# Patient Record
Sex: FEMALE | Race: WHITE
[De-identification: names, ages, dates, MRNs, and addresses within clinical notes are randomized per-mention and may not be internally consistent; named-entity substitution may affect disease eponyms.]

---

## 2018-09-01 ENCOUNTER — HOSPITAL ENCOUNTER (INPATIENT)
Dept: HOSPITAL 43 - DL.OBCHECK | Age: 23
LOS: 2 days | Discharge: HOME | DRG: 560 | End: 2018-09-03
Attending: OBSTETRICS & GYNECOLOGY | Admitting: OBSTETRICS & GYNECOLOGY
Payer: COMMERCIAL

## 2018-09-01 DIAGNOSIS — Z3A.39: ICD-10-CM

## 2018-09-01 PROCEDURE — 00HU33Z INSERTION OF INFUSION DEVICE INTO SPINAL CANAL, PERCUTANEOUS APPROACH: ICD-10-PCS

## 2018-09-01 PROCEDURE — 0HQ9XZZ REPAIR PERINEUM SKIN, EXTERNAL APPROACH: ICD-10-PCS | Performed by: OBSTETRICS & GYNECOLOGY

## 2018-09-01 NOTE — PCM.SN
- Free Text/Narrative


Note: 





Intrathecal. Sitting position, sterile prep and drape. 1 % lidocaine w bicarb 

for skinwheal to L3 L4 interspace, introducer, 24 ga pencan x 1. Pos CSF, neg 

heme, neg parasthesia. 1:1000 epi wash pf, 20 mcg pf sufenta, 30 mcg pf fentanyl

, 0.4 ml pf ns and 6 mg of 0.75% pf bupivacaine injected after CSf aspiration. 

Pt to L lateral position. Procedure time 0693to 0647

## 2018-09-02 RX ADMIN — VITAMIN A, ASCORBIC ACID, CHOLECALCIFEROL, .ALPHA.-TOCOPHEROL ACETATE, DL-, THIAMINE MONONITRATE, RIBOFLAVIN, NIACINAMIDE, PYRIDOXINE HYDROCHLORIDE, FOLIC ACID, CYANOCOBALAMIN, CALCIUM CARBONATE, IRON, ZINC OXIDE, AND CUPRIC OXIDE SCH EACH: 4000; 120; 400; 22; 1.84; 3; 20; 10; 1; 12; 200; 29; 25; 2 TABLET ORAL at 09:07

## 2018-09-02 NOTE — PN
DATE:  09/02/2018

 

SUBJECTIVE:  Dalia is doing well on postpartum rounds today.  She did deliver

vaginally yesterday.  Please see our delivery note.

 

OBJECTIVE:  Her vital signs are stable today including afebrile.  Her lochia

flow is within normal limits, and her fundus is firm.  She is breastfeeding.

Her extremities are negative.

 

ASSESSMENT:  Stable postpartum course because she is a primigravida and also was

group B streptococcus positive, but did receive 2 doses of antibiotics.  We will

permit discharge tomorrow on Monday morning.

 

PLAN:  See above.  Progressive ambulation, adequate hydration, and healthy

nutrition were reviewed with her.  She will go home tomorrow.

 

DD:  09/02/2018 10:24:22

DT:  09/02/2018 10:31:18

[facility]

Job #:  [id number]

## 2018-09-02 NOTE — DEL
DATE:  2018

 

Dalia has proceeded on during the 2nd stage of labor.  She does have good

pushing effort.  She does have the vertex  for a number of minutes and

does have a very tight introitus, and because of the high likelihood of

sustaining a severe perineal laceration, when she was not able to push the baby

out spontaneously and towards the end of her labor we did a small to average

sized midline episiotomy.  She did proceed on very nicely to now spontaneously

deliver a viable baby boy, who weighed 8 pounds 5 ounces and had Apgar scores of

9 and 9.  The placenta was spontaneous and intact.  Close inspection of the

vaginal vault reveals no major severe lacerations; however, she does have a

small right vaginal sulcus laceration, which was repaired initially with 3-0

Vicryl.  Now, the remainder of the midline episiotomy is repaired in the usual

fashion.  Estimated blood loss from the procedure was approximately 300 mL.  The

patient and baby have remained very stable in the postpartum area.  Please refer

to the electronic health record for further obstetrical details.  Sponge and

instrument count was also reported and verified as correct.

 

DD:  2018 13:23:27

DT:  2018 06:26:38

[facility]

Job #:  [id number]

## 2018-09-03 RX ADMIN — VITAMIN A, ASCORBIC ACID, CHOLECALCIFEROL, .ALPHA.-TOCOPHEROL ACETATE, DL-, THIAMINE MONONITRATE, RIBOFLAVIN, NIACINAMIDE, PYRIDOXINE HYDROCHLORIDE, FOLIC ACID, CYANOCOBALAMIN, CALCIUM CARBONATE, IRON, ZINC OXIDE, AND CUPRIC OXIDE SCH EACH: 4000; 120; 400; 22; 1.84; 3; 20; 10; 1; 12; 200; 29; 25; 2 TABLET ORAL at 07:50

## 2018-09-03 RX ADMIN — VITAMIN A, ASCORBIC ACID, CHOLECALCIFEROL, .ALPHA.-TOCOPHEROL ACETATE, DL-, THIAMINE MONONITRATE, RIBOFLAVIN, NIACINAMIDE, PYRIDOXINE HYDROCHLORIDE, FOLIC ACID, CYANOCOBALAMIN, CALCIUM CARBONATE, IRON, ZINC OXIDE, AND CUPRIC OXIDE SCH: 4000; 120; 400; 22; 1.84; 3; 20; 10; 1; 12; 200; 29; 25; 2 TABLET ORAL at 08:53

## 2018-09-03 NOTE — DISCH
HISTORY OF PRESENT ILLNESS:  This patient is a 22-year-old,  1, now para

1 patient, who is followed by Dr. Maurice.  The patient was scheduled for

possible induction this coming Wednesday if she had not delivered.  However, the

patient did enter the hospital in active labor on 2018.  She was at 39

weeks 1-day gestation with an DANNY of 2018.  She also was known to be GBS

positive.  Please see my dictated H and P, and please see the dictated delivery

note.  The patient is immune to rubella.  Otherwise, her prenatal course has

been quite uncomplicated.  The patient was given IV penicillin G and did receive

approximately 2 doses intrapartum before she delivered.

 

She did proceed on to have a spontaneous vaginal delivery and did have a viable

male who weighed 8 pounds 5 ounces and had Apgar scores of 9 and 9.  The

delivery occurred on 2018.  Because of a very tight vaginal introitus and

in an effort to avoid severe perineal laceration in this case, we did elect to

do a small-to-average midline episiotomy, and this was discussed with she and

her  beforehand, and we did have their permission.  She did have the

repair of her midline episiotomy and small right vaginal sulcus laceration.  She

has continued to do well in the postpartum period.  We do note that she does

complain of some fissures and soreness on her breast areas, so therefore, we are

obtaining the specialized nipple cream that will be given to her consisting of

betamethasone, Eucerin, and miconazole.  The patient will also use a breast

pump.

 

PHYSICAL EXAMINATION:

The patient's vital signs have remained stable.  Her lochia flow is within

normal limits, and her fundus is firm.  Her extremities are negative.

 

LABORATORY DATA:  Discharge hemoglobin is 10.5.

 

DISCHARGE INSTRUCTIONS:  Other discharge instructions consisted of instructing

her to call us if any excess fever, excess pain, or excess bleeding.  She will

contact us at once if any questions or problems or unusual symptoms.  She also

will do progressive ambulation at home.  She will avoid intercourse for

approximately 6 weeks.  She also will do sitz baths at home p.r.n.

 

DISCHARGE MEDICATIONS:

1. Ibuprofen or Tylenol p.r.n.

2. Benzocaine spray p.r.n.

She also will have breast pump and special compounded nipple ointment from our

clinic pharmacy.

 

DIET:  Regular.

 

CONDITION:  Good on discharge.

 

OPERATIONS AND PROCEDURES:

1. Spontaneous vaginal delivery of a viable male who weighs 8 pounds 5 ounces

    and has Apgar scores of 9 and 9.

2. Repair of midline episiotomy and small right vaginal sulcus laceration.

 

FINAL DIAGNOSES:

1.  1, now para 1 at 39 weeks 1-day gestation, delivered.

2. Group B streptococcus positive.

 

DD:  2018 10:58:39

DT:  2018 13:09:45

[facility]

Job #:  [id number]

## 2018-09-04 NOTE — HP
LOCATION:  [facility].

 

HISTORY OF PRESENT ILLNESS:  This patient is a 22-year-old  1 patient,

who is followed by Dr. Maurice.  She does enter the hospital early this morning

on 2018 in labor and does give a history of having had clear amniotic

fluid coming out by spontaneous rupture of membranes at about 3:00 a.m. today on

2018.  The patient is also known to be GBS positive.  She also is immune

to rubella.  She denies any other significant prenatal complications that she is

aware of.

 

PAST MEDICAL HISTORY:  Denies any knowledge of heart, lung, liver, or kidney

disease.

 

ALLERGIES:  None known.

 

 

 

PREVIOUS SURGICAL HISTORY:  None.

 

MEDICATIONS AT PRESENT:

1. Prenatal vitamins.

2. Occasionally, she did take iron during the pregnancy.

 

FAMILY HISTORY:  Noncontributory.

 

SOCIAL HISTORY:  The patient is here with her  today, and the patient is

a nonsmoker and does not use alcohol or any street drugs.  Her mother is Carol Gutierrez from the [facility], and I have personally delivered 2 of Dalia's

brothers previously.

 

PHYSICAL EXAMINATION:

Vital Signs:  Blood pressure 114/68, pulse 72, and respirations 17.

HEENT:  The sclerae are nonicteric.

Lungs:  Clear to A.

Heart:  Regular rhythm without murmur.

Abdomen and Pelvis:  Gravid with fetal heart tones category 1, and the baseline

is 144.  Vertex is the presenting part.  There is negative CVA tenderness

bilaterally.  The patient was thought by the nurses to be close to 4 to 5 cm

dilated when she came in, and when I have seen the patient a bit later, she was

completely dilated and starting to push.  The vertex was at +1 station and 100%

effaced for the cervix when I saw her.  Clinical pelvimetry appears to be

adequate.  Vaginal introitus perhaps slightly tight.

Extremities:  Negative.

Neurologic:  Grossly intact.

 

IMPRESSION:  The patient has an estimated date of delivery of  and is

currently at 39 weeks 1 day gestation with spontaneous rupture of membranes.

The patient is group B Streptococcus positive, and she is being given penicillin

G prophylaxis.  Heart tones are category 1.  We do anticipate spontaneous

vaginal delivery.  We have thoroughly discussed with the patient and her 

what we do anticipate, and all of their questions have been answered.

 

DD:  2018 13:37:53

DT:  2018 21:24:32

[facility]

Job #:  [id number]

## 2020-12-29 NOTE — PCM.LDHP
L&D History of Present Illness





- General


Date of Service: 20 ( H&P)


Admit Problem/Dx: 


                   Patient Status Order with Admit Dx/Problem





20 00:05


Patient Status [ADT] Routine 








                           Admission Diagnosis/Problem











Admission Diagnosis/Problem    Labor established














Source of Information: Patient, Family, Provider, RN, RN Notes Reviewed, 

Significant Other, Other (prenatal record from Alt/EPIC notes--see for 

details)


History Limitations: Reports: No Limitations





- History of Present Illness


Introduction:: 





Delightful 26yo WF  @ 39w4d scheduled for induction later this morning who 

presented in active labor after having membranes stripped in the clinic 

yesterday at her [facility] with me. 


no LOF or bleeding. 


CXNs getting stronger and closer together since then. 


no pre-E sx. 


see prenatal notes for details. 


GBS negative


hx COVID during pregnancy--ASA until 36 weeks


Rubella immune








Location, Pregnancy: Reports: Uterus


Severity: Moderate


Associated Symptoms: Reports: vaginal discharge





- Related Data


Allergies/Adverse Reactions: 


                                    Allergies











Allergy/AdvReac Type Severity Reaction Status Date / Time


 


No Known Allergies Allergy   Verified 18 04:17











Home Medications: 


                                    Home Meds





Ferrous Sulfate 325 mg PO DAILY 18 [History]


Prenatal Vit Calc,Iron,Folic [Prenatal Vitamins] 1 each PO DAILY 18 

[History]











Past Medical History





- Past Health History


Medical/Surgical History: Denies Medical/Surgical History


HEENT History: Reports: None


Cardiovascular History: Reports: None


Respiratory History: Reports: None


Gastrointestinal History: Reports: None


Genitourinary History: Reports: None


OB/GYN History: Reports: Pregnancy


: 2


Para: 1


LMP (Approximate): Pregnant


Other OB/BYN History: prior vaginal delivery--Dr. Quiroz, needed episiotomy.  

elevated one hour sugar screen, 3hour normal


Musculoskeletal History: Reports: None


Neurological History: Reports: None


Psychiatric History: Reports: Anxiety, Depression


Endocrine/Metabolic History: Reports: None, Other (See Below)


Other Endocrine/Metabolic History: elevated one hour sugar screen, 3 hour normal


Hematologic History: Reports: Anemia


Oncologic (Cancer) History: Reports: None


Dermatologic History: Reports: None





- Infectious Disease History


Infectious Disease History: Reports: Other (See Below)


Other Infectious Disease History: Hx of COVID late Oct





- Past Surgical History


Head Surgeries/Procedures: Reports: None


Respiratory Surgical History: Reports: None


Female  Surgical History: Reports: None


Endocrine Surgical History: Reports: None


Musculoskeletal Surgical History: Reports: None





Social & Family History





- Family History


Family Medical History: No Pertinent Family History





- Tobacco Use


Tobacco Use Status *Q: Never Tobacco User


Second Hand Smoke Exposure: No





- Caffeine Use


Caffeine Use: Reports: Coffee


Caffeine Use Comment: 1-2 cups per day





- Recreational Drug Use


Recreational Drug Use: No





- Living Situation & Occupation


Living situation: Reports: , with Family


Occupation: Employed


Social History Comment: Lives with  Peewee and their son Sandra.  

Expecting another boy.  he is .  she has [employer] business.  mom--Celia Gutierrez, brother Fransisco Gutierrez, FLAVIO Aguillon.





H&P Review of Systems





- Review of Systems:


Review Of Systems: Comprehensive ROS is negative, except as noted in HPI.





L&D Exam





- Exam


Exam: See Below





- Vital Signs


Vital Signs: 


                                Last Vital Signs











Temp  98.2 F   20 02:15


 


Pulse  63   20 03:15


 


Resp  16   20 03:15


 


BP  106/55 L  20 03:15


 


Pulse Ox  98   20 03:15











Weight: 214 lb





- OB Specific


Contraction Duration (sec): 70-80


Contraction Frequency (min): 2-3


Contraction Intensity: Moderate


Fetal Movement: Active


Fetal Heart Tones: Present


Fetal Heart Tones per Min: 120


Fetal Heart Rate (FHR) Variability: Moderate (6-25 bmp)


Birth Presentation: Right Occiput Anterior (KAROLINA)


Estimated Fetal Weight: 8+lbs





- Vasques Score


Vasques Score Cervix Position: Posterior


Vasques Score Consistency: Soft


Vasques Score Effacement: >80%


Vasques Score Dilation: > 5 cm


Vasques Score Infant's Station: -1 ,0


Vasques Score Total: 10





- Exam


General: Alert, Oriented


HEENT: Conjunctiva Clear, EOMI, Hearing Intact, Nares Patent, Pupils Equal, 

Pupils Reactive


Neck: Supple


Lungs: Clear to Auscultation, Normal Respiratory Effort


Cardiovascular: Regular Rate, Regular Rhythm


GI/Abdominal Exam: Normal Bowel Sounds


Rectal Exam: Deferred


Genitourinary: Normal external exam, Cervical dilitation, Enlarged uterus


Back Exam: Normal Inspection


Extremities: Normal Inspection


Skin: Warm, Dry, Intact


Neurological: Normal Speech, Normal Tone, Sensation Intact


Psychiatric: Alert, Normal Affect, Normal Mood





- Patient Data


Lab Results Last 24 hrs: 


                         Laboratory Results - last 24 hr











  20 Range/Units





  23:50 


 


WBC  12.6 H  (5.0-10.0)  10^3/uL


 


RBC  4.44  (4.2-5.4)  10^6/uL


 


Hgb  12.6  D  (12.0-16.0)  g/dL


 


Hct  36.5 L  (37.0-47.0)  %


 


MCV  82.2  ()  fL


 


MCH  28.4  (27.0-34.0)  pg


 


MCHC  34.5  (33.0-35.0)  g/dL


 


Plt Count  255  (150-450)  10^3/uL











Result Diagrams: 


                                 20 23:50








- Problem List


(1) Spontaneous onset of labor


SNOMED Code(s): 28201690


   ICD Code: ATZ7039 -    Status: Acute   Current Visit: Yes   





(2) Term pregnancy


SNOMED Code(s): 04829281


   ICD Code: Z34.90 - ENCNTR FOR SUPRVSN OF NORMAL PREGNANCY, UNSP, UNSP 

TRIMESTER   Status: Acute   Current Visit: Yes   





(3) Term pregnancy delivered


SNOMED Code(s): 07065884, 227067866


   ICD Code: O80 - ENCOUNTER FOR FULL-TERM UNCOMPLICATED DELIVERY   Status: 

Acute   Current Visit: Yes   





(4) Vacuum extraction, delivered, current hospitalization


SNOMED Code(s): 148102721


   ICD Code: O66.5 - ATTEMPTED APPLICATION OF VACUUM EXTRACTOR AND FORCEPS   

Status: Acute   Current Visit: Yes   





(5) Vaginal delivery


SNOMED Code(s): 442478804


   ICD Code: O80 - ENCOUNTER FOR FULL-TERM UNCOMPLICATED DELIVERY   Status: 

Acute   Current Visit: Yes   





(6) Mother currently breast-feeding


SNOMED Code(s): 843183756


   ICD Code: Z39.1 - ENCOUNTER FOR CARE AND EXAMINATION OF LACTATING MOTHER   

Status: Acute   Current Visit: Yes   





(7) Rubella immune


SNOMED Code(s): 691177950


   ICD Code: Z78.9 - OTHER SPECIFIED HEALTH STATUS   Status: Acute   Current 

Visit: Yes   





(8) Blood type A+


SNOMED Code(s): 390438414


   ICD Code: Z67.10 - TYPE A BLOOD, RH POSITIVE   Status: Acute   Current Visit:

 Yes   





(9) Group B Streptococcus not isolated


SNOMED Code(s): 433753571


   ICD Code: MSM2403 -    Status: Acute   Current Visit: Yes   





(10) History of 2019 novel coronavirus disease (COVID-19)


SNOMED Code(s): 430764240749998101


   ICD Code: Z86.19 - PERSONAL HISTORY OF OTHER INFECTIOUS AND PARASITIC 

DISEASES   Status: Acute   Current Visit: Yes   


Problem List Initiated/Reviewed/Updated: Yes


Orders Last 24hrs: 


                               Active Orders 24 hr











 Category Date Time Status


 


 Patient Status [ADT] Routine ADT  20 00:05 Active


 


 Communication Order [RC] ASDIRECTED Care  20 00:05 Active


 


 Communication Order [RC] PER UNIT ROUTINE Care  20 00:05 Active


 


 Communication Order [RC] PER UNIT ROUTINE Care  20 00:05 Active


 


 Communication Order [RC] PER UNIT ROUTINE Care  20 00:05 Active


 


 Fetal Heart Tones [RC] PER UNIT ROUTINE Care  20 00:05 Active


 


 Notify Provider Vital Signs OB [RC] ASDIRECTED Care  20 00:05 Active


 


 Notify Provider [RC] PRN Care  20 00:05 Active


 


 Pump Management, Intrathecal [RC] ASDIRECTED Care  20 00:05 Active


 


 Up ad [place] [RC] ASDIRECTED Care  20 00:05 Active


 


 Vital Signs [RC] PER UNIT ROUTINE Care  20 00:05 Active


 


 Vital Signs [RC] PFP Care  20 05:22 Active


 


 Consult to Lactation Consultant [CONS] Routine Cons  20 05:20 Active


 


 Regular Diet [DIET] Diet  20 Breakfast Active


 


 Acetaminophen [TylenoL] Med  20 00:05 Active





 650 mg PO Q4H PRN   


 


 Benzocaine/Menthol [Dermoplast Pain Relief Spray] Med  20 05:20 Active





 See Dose Instructions  TOP Q4H PRN   


 


 Carboprost Tromethamine [Hemabate DS] Med  20 00:05 Active





 250 mcg IM ASDIRECTED PRN   


 


 Docusate Sodium [Colace] Med  20 05:20 Active





 100 mg PO BID PRN   


 


 Ibuprofen [Motrin] Med  20 05:20 Active





 800 mg PO Q8H PRN   


 


 Lactated Ringers [Ringers, Lactated] 1,000 ml Med  20 00:05 Active





 IV ASDIRECTED   


 


 Lactated Ringers [Ringers, Lactated] 500 ml Med  20 00:05 Active





 IV SEECOMMENT   


 


 Lidocaine 1% [Xylocaine-MPF 1%] Med  20 00:05 Active





 30 ml INJECT ASDIRECTED PRN   


 


 Methylergonovine [Methergine] Med  20 00:05 Active





 0.2 mg IM ASDIRECTED PRN   


 


 Naloxone [Narcan] Med  20 00:05 Active





 0.1 mg IVPUSH SEECOMMENT PRN   


 


 Ondansetron [Zofran] Med  20 00:05 Active





 4 mg IVPUSH Q4H PRN   


 


 Oxytocin/Normal Saline [Pitocin in NS 30 UNIT/500 ML] Med  20 00:05 

Active





 30 unit in 500 ml IV TITRATE   


 


 Prenatal Vit with Ca/FA/Iron [Prenatal Plus Iron] Med  20 09:00 Active





 1 each PO DAILY   


 


 Simethicone Med  20 05:20 Active





 80 mg PO Q4H PRN   


 


 Sodium Chloride 0.9% [Saline Flush] Med  20 00:05 Active





 10 ml FLUSH ASDIRECTED PRN   


 


 Tranexamic Acid [Cyklokapron] 1,000 mg Med  20 00:05 Active





 Sodium Chloride 0.9% [Normal Saline] 100 ml   





 IV ONETIME   


 


 Zolpidem [Ambien] Med  20 05:20 Active





 5 mg PO BEDTIME PRN   


 


 ePHEDrine [ePHEDrine sulfate] Med  20 00:05 Active





 5 mg IVPUSH Q5M PRN   


 


 miSOPROStoL [Cytotec] Med  20 00:05 Active





 800 mcg RECTAL ASDIRECTED PRN   


 


 Assess Lochia [WOMSER] Per Unit Routine Oth  20 05:22 Ordered


 


 Assess Uterine Involution [WOMSER] Per Unit Routine Oth  20 05:22 Ordered


 


 Blood Pressure [OM.PC] Routine Oth  20 00:05 Ordered


 


 Breast Pump [WOMSER] Per Unit Routine Oth  20 05:22 Ordered


 


 Ice Therapy [OM.PC] Per Unit Routine Oth  20 05:22 Ordered


 


 Perineal Care [OM.PC] Per Unit Routine Oth  20 05:22 Ordered


 


 Saline Lock Insert [OM.PC] Routine Oth  20 00:05 Ordered


 


 Sitz Bath [OM.PC] Per Unit Routine Oth  20 05:22 Ordered


 


 Resuscitation Status Routine Resus Stat  20 01:39 Ordered








                                Medication Orders





Acetaminophen (Tylenol)  650 mg PO Q4H PRN


   PRN Reason: Pain (Mild 1-3) and fever


Benzocaine/Menthol (Dermoplast Pain Relief Spray)  0 gm TOP Q4H PRN


   PRN Reason: Perineal comfort measures


Carboprost Tromethamine (Hemabate Ds)  250 mcg IM ASDIRECTED PRN


   PRN Reason: HEMORRHAGE


Docusate Sodium (Colace)  100 mg PO BID PRN


   PRN Reason: Constipation


Ephedrine Sulfate (Ephedrine Sulfate)  5 mg IVPUSH Q5M PRN


   PRN Reason: See Label Comments


Lactated Ringer's (Ringers, Lactated)  1,000 mls @ 125 mls/hr IV ASDIRECTED [facility]


   Last Admin: 20 02:11  Dose: 125 mls/hr


   Documented by: TORRI


   Infusion: 20 02:11  Dose: 125 mls/hr


   Documented by: TORRI


   Admin: 20 00:13  Dose: 125 mls/hr


   Documented by: TORRI


   Infusion: 20 00:13  Dose: 125 mls/hr


   Documented by: TORRI


   Admin: 20 23:50  Dose: 125 mls/hr


   Documented by: TORRI


Tranexamic Acid 1,000 mg/ (Sodium Chloride)  110 mls @ 660 mls/hr IV ONETIME PRN


   PRN Reason: Bleeding


Oxytocin/Sodium Chloride (Pitocin In Ns 30 Unit/500 Ml)  30 unit in 500 mls @ 2 

mls/hr IV TITRATE YARI; Protocol


   Last Titration: 20 03:27  Dose: 6 munits/min, 6 mls/hr


   Documented by: TORRI


   Titration: 20 02:53  Dose: 4 munits/min, 4 mls/hr


   Documented by: TORRI


   Admin: 20 02:23  Dose: 2 munits/min, 2 mls/hr


   Documented by: TORRI


Lactated Ringer's (Ringers, Lactated)  500 mls @ 999 mls/hr IV SEECOMMENT YARI


Ibuprofen (Motrin)  800 mg PO Q8H PRN


   PRN Reason: Mild Pain or Fever


Lidocaine HCl (Xylocaine-Mpf 1%)  30 ml INJECT ASDIRECTED PRN


   PRN Reason: Perineal Repair


Methylergonovine Maleate (Methergine)  0.2 mg IM ASDIRECTED PRN


   PRN Reason: Hemorrhage


Misoprostol (Cytotec)  800 mcg RECTAL ASDIRECTED PRN


   PRN Reason: Hemorrhage


Naloxone HCl (Narcan)  0.1 mg IVPUSH SEECOMMENT PRN


   PRN Reason: Respiratory Depression


Ondansetron HCl (Zofran)  4 mg IVPUSH Q4H PRN


   PRN Reason: Nausea/Vomiting


   Last Admin: 20 00:23  Dose: 4 mg


   Documented by: TORRI


Prenat Multivit//Iron/Folic Ac (Prenatal Plus Iron)  1 each PO DAILY YARI


Simethicone (Simethicone)  80 mg PO Q4H PRN


   PRN Reason: Gas


Sodium Chloride (Saline Flush)  10 ml FLUSH ASDIRECTED PRN


   PRN Reason: Keep Vein Open


Zolpidem Tartrate (Ambien)  5 mg PO BEDTIME PRN


   PRN Reason: Insomnia








Assessment/Plan Comment:: 





Assessment: 


26yo WF  @ 39w4d with onset labor


advanced cervical dilation


A+ blood type


rubella immune


GBS negative


Hx COVID infection--ECASA to 36 weeks


NST reactive





Plan: 


admit to L&D


routine admit orders


pitocin augmentation if indicated


AROM if needed


plan for expected vaginal delivery. 


All questions answered. 


hmb

## 2020-12-29 NOTE — PCM.DEL
L & D Note





- General Info


Date of Service: 20 (time of birth: 0457)


Mother's Due Date: 21 (39w4d)





- Delivery Note


Labor: Spontaneous


Delivery Outcome: Livebirth


Infant Delivery Method: Spontaneous Vaginal Delivery-Single


Infant Delivery Mode: Vacuum Extraction


Birth Presentation: Left Occiput Anterior (ANDERSON)


Nuchal Cord: None


Prep: Povidone-Iodine (Betadine


Anesthesia Type: Intrathecal


Amniotic Fluid Description: Clear


Episiotomy Type: None


Laceration: None


Placenta: Intact, Spontaneous


Cord: 3 Vessels


Estimated Blood Loss: 222


Resuscitation Needed: No


[place]: Suctioned, Bulb Syringe, Stimulated, Warmed, Jet Used


 Provider: Yenny Maurice (Shannan Schwarz)


APGAR Score 1 min: 8


APGAR Score 5 min: 9


Second Stage Interventions: Reports: Second Nurse Assessed Progress of Descent, 

Second Nurse Reviewed Contraction Pattern, Second Nurse Reviewed Fetal Heart 

Tones, Encouragement Given, Pushing Effectively, Pushing, Pulls Own Legs Back


Delivery Comments (Free Text/Narrative):: 





Dalia pushing well, but baby stops at +2 position and mom is getting 

frustrated and fatigued. 


ANDERSON, fetal heart tones dip with cxns but recover, though more slowly at times


she would like vacuum assist, and also requests consideration for episiotomy. 


we elected to avoid episiotomy, but did proceed with vacuum assist, trying the 

low profile first, which was unsuccessful and kept slipping off the baby's head,

then allowed her to push again, in McRobert's position with assist to hold legs.




when baby's head was nearly , the [place] bell vacuum placed and baby 

delivered with next cxn. 


ANDERSON, pop of left shoulder noted when came under pubic bone. 


baby dried and stimulated.  strong cry at birth. 


placed on mom's chest for bonding and nursing. 


cord clamped X 2 by me, then cut by dad. 


cord blood obtained. 


placenta delivered intact


perineum intact. 


uterus firm, pitocin running per protocol. 


-250cc


both mom and baby stable


hmb











Induction Criteria





- Vasques Score


Vasques Score Dilation: > 5 cm


Vasques Score Effacement: >80%


Vasques Score Infant's Station: -1 ,0


Vasques Score Consistency: Soft


Vasques Score Cervix Position: Posterior


Vasques Score Total: 10


Vasques Score Presenting Part: Reports: Cephalic





- Induction


Gestational Age >/= 39 wks: Yes


Estimated Pelvis: Reports: Adequate


Reassuring Fetal Monitoring Strip: Yes


Absence of Tachy Systole: Yes





- Augmentation


Estimated Pelvis: Reports: Adequate


Fetal Weight Estimated:: Reports: AGA


Reassuring Fetal Monitoring Strip: Yes


Absence of Tachy Systole: Yes





Vacuum Extractor Progress Note





- Alternative Labor Strategies Considered


Alternative Labor Strategies Considered:: Reports: Yes


Strategies Considered:: Reports: Contraction Intensity Adequate, Position 

Changes Used to Facilitate Rotation & Descent, Empty Bladder


Indications Considered:: Reports: Yes


Indications:: Reports: Shortening of 2nd Stage for Maternal Benefit, Suspicion 

of Immediate or Potential Fetal Compromise


Time Out:: Reports: Yes


Comments:: 





Shannan Tovar and Mercedez Sosa present. 








- Patient Prepared


Patient Prepared:: Reports: Yes


Informed Consent:: Reports: Verbal


Risks: Reports: Yes


Anesthesia/Analgesia Adequate:: Reports: Yes





- Probability of Success


High Probability of Success:: Reports: Yes


Fetal Weight Estimated:: Reports: AGA


Patient Diabetic:: Reports: No


Pelvis Adequate:: Reports: Yes


Position:: [place]


Asynclitic:: Reports: No


Station:: +2





- Application Time


Maximum Application Time & Number of Pop-Offs Predetermined:: Reports: Yes


Type of Vacuum Used:: Reports: Low profile, Cup: Bell type, Cup: Mushroom type


Vacuum Extraction: Successful





- Exit Strategy


Exit strategy available:: Reports: Yes


 and resuscitation teams readily available:: Reports: Yes


Consult as indicated:: not indicated





- General Info


Date of Service: 20 (Time of delivery: 457)





- Patient Data


Vitals - Most Recent: 


                                Last Vital Signs











Temp  98.2 F   20 02:15


 


Pulse  63   20 03:15


 


Resp  16   20 03:15


 


BP  106/55 L  20 03:15


 


Pulse Ox  98   20 03:15











Weight - Most Recent: 214 lb


Lab Results Last 24 Hours: 


                         Laboratory Results - last 24 hr











  20 Range/Units





  23:50 


 


WBC  12.6 H  (5.0-10.0)  10^3/uL


 


RBC  4.44  (4.2-5.4)  10^6/uL


 


Hgb  12.6  D  (12.0-16.0)  g/dL


 


Hct  36.5 L  (37.0-47.0)  %


 


MCV  82.2  ()  fL


 


MCH  28.4  (27.0-34.0)  pg


 


MCHC  34.5  (33.0-35.0)  g/dL


 


Plt Count  255  (150-450)  10^3/uL











Med Orders - Current: 


                               Current Medications





Acetaminophen (Tylenol)  650 mg PO Q4H PRN


   PRN Reason: Pain (Mild 1-3) and fever


Benzocaine/Menthol (Dermoplast Pain Relief Spray)  0 gm TOP Q4H PRN


   PRN Reason: Perineal comfort measures


Carboprost Tromethamine (Hemabate Ds)  250 mcg IM ASDIRECTED PRN


   PRN Reason: HEMORRHAGE


Docusate Sodium (Colace)  100 mg PO BID PRN


   PRN Reason: Constipation


Ephedrine Sulfate (Ephedrine Sulfate)  5 mg IVPUSH Q5M PRN


   PRN Reason: See Label Comments


Lactated Ringer's (Ringers, Lactated)  1,000 mls @ 125 mls/hr IV ASDIRECTED YARI


   Last Admin: 20 02:11 Dose:  125 mls/hr


   Documented by: 


Tranexamic Acid 1,000 mg/ (Sodium Chloride)  110 mls @ 660 mls/hr IV ONETIME PRN


   PRN Reason: Bleeding


Oxytocin/Sodium Chloride (Pitocin In Ns 30 Unit/500 Ml)  30 unit in 500 mls @ 2 

mls/hr IV TITRATE YARI; Protocol


   Last Titration: 20 03:27 Dose:  6 munits/min, 6 mls/hr


   Documented by: 


Lactated Ringer's (Ringers, Lactated)  500 mls @ 999 mls/hr IV SEECOMMENT YARI


Ibuprofen (Motrin)  800 mg PO Q8H PRN


   PRN Reason: Mild Pain or Fever


Lidocaine HCl (Xylocaine-Mpf 1%)  30 ml INJECT ASDIRECTED PRN


   PRN Reason: Perineal Repair


Methylergonovine Maleate (Methergine)  0.2 mg IM ASDIRECTED PRN


   PRN Reason: Hemorrhage


Misoprostol (Cytotec)  800 mcg RECTAL ASDIRECTED PRN


   PRN Reason: Hemorrhage


Naloxone HCl (Narcan)  0.1 mg IVPUSH SEECOMMENT PRN


   PRN Reason: Respiratory Depression


Ondansetron HCl (Zofran)  4 mg IVPUSH Q4H PRN


   PRN Reason: Nausea/Vomiting


   Last Admin: 20 00:23 Dose:  4 mg


   Documented by: 


Prenat Multivit/[place]/Iron/Folic Ac (Prenatal Plus Iron)  1 each PO DAILY YARI


Simethicone (Simethicone)  80 mg PO Q4H PRN


   PRN Reason: Gas


Sodium Chloride (Saline Flush)  10 ml FLUSH ASDIRECTED PRN


   PRN Reason: Keep Vein Open


Zolpidem Tartrate (Ambien)  5 mg PO BEDTIME PRN


   PRN Reason: Insomnia





Discontinued Medications





Epinephrine HCl (Adrenalin) Confirm Administered Dose 1 mg .ROUTE .STK-MED ONE


   Stop: 20 00:26


   Last Admin: 20 01:47 Dose:  Not Given


   Documented by: 


Fentanyl (Sublimaze) Confirm Administered Dose 100 mcg .ROUTE .STK-MED ONE


   Stop: 20 00:26


   Last Admin: 20 01:47 Dose:  Not Given


   Documented by: 


Lactated Ringer's (Ringers, Lactated)  1,000 mls @ 999 mls/hr IV BOLUS ONE


   Stop: 20 01:05


Ondansetron HCl (Zofran) Confirm Administered Dose 4 mg .ROUTE .STK-MED ONE


   Stop: 20 00:16


   Last Admin: 20 01:47 Dose:  Not Given


   Documented by: 


Sodium Bicarbonate (Sodium Bicarbonate 4.2%) Confirm Administered Dose 2.5 meq 

.ROUTE .STK-MED ONE


   Stop: 20 00:27


   Last Admin: 20 01:47 Dose:  Not Given


   Documented by: 


Sufentanil Citrate (Sufenta) Confirm Administered Dose 50 mcg .ROUTE .STK-MED 

ONE


   Stop: 20 00:26


   Last Admin: 20 01:47 Dose:  Not Given


   Documented by: 











- Problem List & Annotations


(1) Spontaneous onset of labor


SNOMED Code(s): 19409707


   Code(s): NPM9524 -    Status: Acute   Current Visit: Yes   





(2) Term pregnancy


SNOMED Code(s): 73330803


   Code(s): Z34.90 - ENCNTR FOR SUPRVSN OF NORMAL PREGNANCY, UNSP, UNSP 

TRIMESTER   Status: Acute   Current Visit: Yes   





(3) Term pregnancy delivered


SNOMED Code(s): 84806374, 313922162


   Code(s): O80 - ENCOUNTER FOR FULL-TERM UNCOMPLICATED DELIVERY   Status: Acute

  Current Visit: Yes   





(4) Vacuum extraction, delivered, current hospitalization


SNOMED Code(s): 667294206


   Code(s): O66.5 - ATTEMPTED APPLICATION OF VACUUM EXTRACTOR AND FORCEPS   

Status: Acute   Current Visit: Yes   





(5) Vaginal delivery


SNOMED Code(s): 206203394


   Code(s): O80 - ENCOUNTER FOR FULL-TERM UNCOMPLICATED DELIVERY   Status: Acute

  Current Visit: Yes   





(6) Mother currently breast-feeding


SNOMED Code(s): 953654681


   Code(s): Z39.1 - ENCOUNTER FOR CARE AND EXAMINATION OF LACTATING MOTHER   S

tatus: Acute   Current Visit: Yes   





(7) Rubella immune


SNOMED Code(s): 403648751


   Code(s): Z78.9 - OTHER SPECIFIED HEALTH STATUS   Status: Acute   Current 

Visit: Yes   





(8) Blood type A+


SNOMED Code(s): 480150631


   Code(s): Z67.10 - TYPE A BLOOD, RH POSITIVE   Status: Acute   Current Visit: 

Yes   





(9) Group B Streptococcus not isolated


SNOMED Code(s): 721365701


   Code(s): QOS3599 -    Status: Acute   Current Visit: Yes   





(10) History of 2019 novel coronavirus disease (COVID-19)


SNOMED Code(s): 731164687649279168


   Code(s): Z86.19 - PERSONAL HISTORY OF OTHER INFECTIOUS AND PARASITIC DISEASES

  Status: Acute   Current Visit: Yes   





- Problem List Review


Problem List Initiated/Reviewed/Updated: Yes





- My Orders


Last 24 Hours: 


My Active Orders





20 00:05


Patient Status [ADT] Routine 


Communication Order [RC] ASDIRECTED 


Communication Order [RC] PER UNIT ROUTINE 


Fetal Heart Tones [RC] PER UNIT ROUTINE 


Notify Provider Vital Signs OB [RC] ASDIRECTED 


Notify Provider [RC] PRN 


Pump Management, Intrathecal [RC] ASDIRECTED 


Up ad [place] [RC] ASDIRECTED 


Vital Signs [RC] PER UNIT ROUTINE 


Acetaminophen [TylenoL]   650 mg PO Q4H PRN 


Carboprost Tromethamine [Hemabate DS]   250 mcg IM ASDIRECTED PRN 


Lactated Ringers [Ringers, Lactated] 1,000 ml IV ASDIRECTED 


Lactated Ringers [Ringers, Lactated] 500 ml IV SEECOMMENT 


Lidocaine 1% [Xylocaine-MPF 1%]   30 ml INJECT ASDIRECTED PRN 


Methylergonovine [Methergine]   0.2 mg IM ASDIRECTED PRN 


Naloxone [Narcan]   0.1 mg IVPUSH SEECOMMENT PRN 


Ondansetron [Zofran]   4 mg IVPUSH Q4H PRN 


Oxytocin/Normal Saline [Pitocin in NS 30 UNIT/500 ML] 30 unit in 500 ml IV 

TITRATE 


Sodium Chloride 0.9% [Saline Flush]   10 ml FLUSH ASDIRECTED PRN 


Tranexamic Acid [Cyklokapron] 1,000 mg   Sodium Chloride 0.9% [Normal Saline] 

100 ml IV ONETIME 


ePHEDrine [ePHEDrine sulfate]   5 mg IVPUSH Q5M PRN 


miSOPROStoL [Cytotec]   800 mcg RECTAL ASDIRECTED PRN 


Blood Pressure [OM.PC] Routine 


Saline Lock Insert [OM.PC] Routine 





20 01:39


Resuscitation Status Routine 





20 05:20


Consult to Lactation Consultant [CONS] Routine 


Benzocaine/Menthol [Dermoplast Pain Relief Spray]   See Dose Instructions  TOP 

Q4H PRN 


Docusate Sodium [Colace]   100 mg PO BID PRN 


Ibuprofen [Motrin]   800 mg PO Q8H PRN 


Simethicone   80 mg PO Q4H PRN 


Zolpidem [Ambien]   5 mg PO BEDTIME PRN 





20 05:22


Vital Signs [RC] PFP 


Assess Lochia [WOMSER] Per Unit Routine 


Assess Uterine Involution [WOMSER] Per Unit Routine 


Breast Pump [WOMSER] Per Unit Routine 


Ice Therapy [OM.PC] Per Unit Routine 


Perineal Care [OM.PC] Per Unit Routine 


Sitz Bath [OM.PC] Per Unit Routine 





20 Breakfast


Regular Diet [DIET] 





20 09:00


Prenatal Vit with Ca/FA/Iron [Prenatal Plus Iron]   1 each PO DAILY 














- Plan


Plan:: 





Assessment: 


26yo WF  @ 39w4d with onset labor


advanced cervical dilation


A+ blood type


rubella immune


GBS negative


Hx COVID infection--ECASA to 36 weeks


NST reactive





Plan: 


admit to L&D


routine admit orders


pitocin augmentation if indicated


AROM if needed


plan for expected vaginal delivery. 


All questions answered. 


hmb





Delivery: 


2020 @ 1084


viable male 


BW: 3435g/ 7lb 10oz


VAVD


intact perineum


APGARs 8 & 9


breastfeeding. 


hmb

## 2020-12-29 NOTE — PCM.SN.2
- Free Text/Narrative


Note: 





Intrathecal. Sitting position, sterile prep and drape. 1% lidocaine w bicarb for

skinwheal to L2 L3 interspace, introducer, 24 ga pencan x 1. Pos CSF neg heme, 

neg parasthesia. 0.1 ml pf 1:1000 epi, 20 mcg pf sufenta, 30 mcg pf fentanyl, 

0.4 ml pf NS and 6 mg of 0.75% PF marcaine injected after CSF aspiration. Pt to 

L lateral position. Procedure time 0025 to 0055

## 2020-12-30 NOTE — PCM.DCSUM1
**Discharge Summary





- Hospital Course


Free Text/Narrative:: 





Dalia is a 26yo G2 now P2 who presented in labor @ 39w4d and progressed on to 

complete dilation with pitocin augmentation, and had SROM after pushing during 

her 2nd stage of labor.  vacuum placed, and she delivered a viable male infant 

7lb 10oz/3435g over intact perineum @ 0457 on 12- with APGARs 8 & 9 and 

no complications.  Both mom and baby were then followed with routine cares and 

orders. see admit H&P, prenatal notes, and delivery note for details. 


mom is GBS negative, A+, RI, Hx COVID/recovered. 


hmb


Diagnosis: Stroke: No





- Discharge Data


Discharge Date: 12/30/20 (postpartum #1)


Discharge Disposition: Home, Self-Care 01


Condition: Good





- Referral to Home Health


Date of Face to Face Encounter: 12/30/20 (for breast pump prn)


Primary Care Physician: 


Yenny Maurice MD








- Discharge Diagnosis/Problem(s)


(1) Spontaneous onset of labor


SNOMED Code(s): 16959282


   ICD Code: [id number] -    Status: Acute   Current Visit: Yes   





(2) Term pregnancy


SNOMED Code(s): 12759904


   ICD Code: Z34.90 - ENCNTR FOR SUPRVSN OF NORMAL PREGNANCY, UNSP, UNSP 

TRIMESTER   Status: Acute   Current Visit: Yes   





(3) Term pregnancy delivered


SNOMED Code(s): 25944934, 050284792


   ICD Code: O80 - ENCOUNTER FOR FULL-TERM UNCOMPLICATED DELIVERY   Status: 

Acute   Current Visit: Yes   





(4) Vacuum extraction, delivered, current hospitalization


SNOMED Code(s): 005690713


   ICD Code: O66.5 - ATTEMPTED APPLICATION OF VACUUM EXTRACTOR AND FORCEPS   Sta

tus: Acute   Current Visit: Yes   





(5) Vaginal delivery


SNOMED Code(s): 557758923


   ICD Code: O80 - ENCOUNTER FOR FULL-TERM UNCOMPLICATED DELIVERY   Status: 

Acute   Current Visit: Yes   





(6) Mother currently breast-feeding


SNOMED Code(s): 822074906


   ICD Code: Z39.1 - ENCOUNTER FOR CARE AND EXAMINATION OF LACTATING MOTHER   

Status: Acute   Current Visit: Yes   





(7) Rubella immune


SNOMED Code(s): 888318423


   ICD Code: Z78.9 - OTHER SPECIFIED HEALTH STATUS   Status: Acute   Current 

Visit: Yes   





(8) Blood type A+


SNOMED Code(s): 548064974


   ICD Code: Z67.10 - TYPE A BLOOD, RH POSITIVE   Status: Acute   Current Visit:

Yes   





(9) Group B Streptococcus not isolated


SNOMED Code(s): 109265966


   ICD Code: LBX4745 -    Status: Acute   Current Visit: Yes   





(10) History of 2019 novel coronavirus disease (COVID-19)


SNOMED Code(s): 483858993340922314


   ICD Code: Z86.19 - PERSONAL HISTORY OF OTHER INFECTIOUS AND PARASITIC 

DISEASES   Status: Acute   Current Visit: Yes   





- Patient Summary/Data


Consults: 


                                  Consultations





12/29/20 05:20


Consult to Lactation Consultant [CONS] Routine 











Hospital Course: 





hospital/postpartum course uneventful. 


fundus remained firm, flow decreased. 


voiding well


eating, ambulating well


breastfeeding and seen by lactation consultant. 


APNO ordered due to nipple soreness





remained afebrile with VSS


requesting early discharge, and was discharged home on 12- in good 

condition with routine discharge instructins. 


follow up for 6 week PP check and sooner prn. 


all questions answered. 


hmb





- Patient Instructions


Diet: Usual Diet as Tolerated


Activity: As Tolerated


Showering/Bathing: May Shower


Notify Provider of: Fever, Increased Pain, Swelling and Redness, Drainage, 

Nausea and/or Vomiting





- Discharge Plan


*PRESCRIPTION DRUG MONITORING PROGRAM REVIEWED*: Not Applicable


*COPY OF PRESCRIPTION DRUG MONITORING REPORT IN PATIENT NIRAV: Not Applicable


Home Medications: 


                                    Home Meds





Ferrous Sulfate 325 mg PO DAILY 09/01/18 [History]


Prenatal Vit Calc,Iron,Folic [Prenatal Vitamins] 1 each PO DAILY 09/01/18 

[History]











- Discharge Summary/Plan Comment


DC Time >30 min.: No


Discharge Summary/Plan Comment: 





postpartm check 6 weeks. 


APNO as needed. 





- General Info


Date of Service: 12/30/20


Subjective Update: 





doing well. nursing, eating ambulating, voiding well. no complaints. ready to go

 home. 


Functional Status: Reports: Pain Controlled





- Review of Systems


General: Reports: No Symptoms


HEENT: Reports: No Symptoms


Pulmonary: Reports: No Symptoms


Cardiovascular: Reports: No Symptoms


Gastrointestinal: Reports: No Symptoms


Genitourinary: Reports: No Symptoms


Musculoskeletal: Reports: No Symptoms


Skin: Reports: No Symptoms


Neurological: Reports: No Symptoms


Psychiatric: Reports: No Symptoms





- Patient Data


Vitals - Most Recent: 


                                Last Vital Signs











Temp  98.2 F   12/30/20 09:00


 


Pulse  66   12/30/20 09:00


 


Resp  16   12/30/20 09:00


 


BP  109/64   12/30/20 09:00


 


Pulse Ox  100   12/30/20 09:00











Weight - Most Recent: 214 lb


Med Orders - Current: 


                               Current Medications





Acetaminophen (Tylenol)  650 mg PO Q4H PRN


   PRN Reason: Pain (Mild 1-3) and fever


   Last Admin: 12/30/20 06:03 Dose:  650 mg


   Documented by: 


Benzocaine/Menthol (Dermoplast Pain Relief Spray)  0 gm TOP Q4H PRN


   PRN Reason: Perineal comfort measures


   Last Admin: 12/29/20 08:14 Dose:  1 spray


   Documented by: 


Carboprost Tromethamine (Hemabate Ds)  250 mcg IM ASDIRECTED PRN


   PRN Reason: HEMORRHAGE


Docusate Sodium (Colace)  100 mg PO BID PRN


   PRN Reason: Constipation


   Last Admin: 12/30/20 08:49 Dose:  100 mg


   Documented by: 


Ephedrine Sulfate (Ephedrine Sulfate)  5 mg IVPUSH Q5M PRN


   PRN Reason: See Label Comments


Lactated Ringer's (Ringers, Lactated)  1,000 mls @ 125 mls/hr IV ASDIRECTED YARI


   Last Admin: 12/29/20 02:11 Dose:  125 mls/hr


   Documented by: 


Tranexamic Acid 1,000 mg/ (Sodium Chloride)  110 mls @ 660 mls/hr IV ONETIME PRN


   PRN Reason: Bleeding


Oxytocin/Sodium Chloride (Pitocin In Ns 30 Unit/500 Ml)  30 unit in 500 mls @ 2 

mls/hr IV TITRATE YARI; Protocol


   Last Titration: 12/29/20 08:00 Dose:  Infused


   Documented by: 


Lactated Ringer's (Ringers, Lactated)  500 mls @ 999 mls/hr IV SEECOMMENT YARI


Ibuprofen (Motrin)  800 mg PO Q8H PRN


   PRN Reason: Mild Pain or Fever


   Last Admin: 12/30/20 08:50 Dose:  800 mg


   Documented by: 


Lidocaine HCl (Xylocaine-Mpf 1%)  30 ml INJECT ASDIRECTED PRN


   PRN Reason: Perineal Repair


Methylergonovine Maleate (Methergine)  0.2 mg IM ASDIRECTED PRN


   PRN Reason: Hemorrhage


Misoprostol (Cytotec)  800 mcg RECTAL ASDIRECTED PRN


   PRN Reason: Hemorrhage


Naloxone HCl (Narcan)  0.1 mg IVPUSH SEECOMMENT PRN


   PRN Reason: Respiratory Depression


Ondansetron HCl (Zofran)  4 mg IVPUSH Q4H PRN


   PRN Reason: Nausea/Vomiting


   Last Admin: 12/29/20 00:23 Dose:  4 mg


   Documented by: 


Prenat Multivit/[place]/Iron/Folic Ac (Prenatal Plus Iron)  1 each PO DAILY YARI


   Last Admin: 12/30/20 08:50 Dose:  1 each


   Documented by: 


Simethicone (Simethicone)  80 mg PO Q4H PRN


   PRN Reason: Gas


Sodium Chloride (Saline Flush)  10 ml FLUSH ASDIRECTED PRN


   PRN Reason: Keep Vein Open


Zolpidem Tartrate (Ambien)  5 mg PO BEDTIME PRN


   PRN Reason: Insomnia





Discontinued Medications





Epinephrine HCl (Adrenalin) Confirm Administered Dose 1 mg .ROUTE .STK-MED ONE


   Stop: 12/29/20 00:26


   Last Admin: 12/29/20 01:47 Dose:  Not Given


   Documented by: 


Fentanyl (Sublimaze) Confirm Administered Dose 100 mcg .ROUTE .STK-MED ONE


   Stop: 12/29/20 00:26


   Last Admin: 12/29/20 01:47 Dose:  Not Given


   Documented by: 


Lactated Ringer's (Ringers, Lactated)  1,000 mls @ 999 mls/hr IV BOLUS ONE


   Stop: 12/29/20 01:05


   Last Admin: 12/29/20 06:54 Dose:  Not Given


   Documented by: 


Ondansetron HCl (Zofran) Confirm Administered Dose 4 mg .ROUTE .STK-MED ONE


   Stop: 12/29/20 00:16


   Last Admin: 12/29/20 01:47 Dose:  Not Given


   Documented by: 


Sodium Bicarbonate (Sodium Bicarbonate 4.2%) Confirm Administered Dose 2.5 meq 

.ROUTE .STK-MED ONE


   Stop: 12/29/20 00:27


   Last Admin: 12/29/20 01:47 Dose:  Not Given


   Documented by: 


Sufentanil Citrate (Sufenta) Confirm Administered Dose 50 mcg .ROUTE .STK-MED 

ONE


   Stop: 12/29/20 00:26


   Last Admin: 12/29/20 01:47 Dose:  Not Given


   Documented by: 











- Exam


General: Reports: Alert, Oriented


HEENT: Reports: Pupils Equal, Pupils Reactive, EOMI, Mucous Membr. Moist/Pink


Neck: Reports: Supple


Lungs: Reports: Normal Respiratory Effort


Rectal (Female) Exam: Deferred


Back Exam: Reports: Normal Inspection, Full Range of Motion


Extremities: Normal Inspection, Normal Range of Motion, Non-Tender, No Pedal 

Edema, Normal Capillary Refill


Skin: Reports: Warm, Dry, Intact


Neurological: Reports: No New Focal Deficit


Psy/Mental Status: Reports: Alert, Normal Affect, Normal Mood